# Patient Record
Sex: MALE | Race: OTHER | Employment: OTHER | URBAN - METROPOLITAN AREA
[De-identification: names, ages, dates, MRNs, and addresses within clinical notes are randomized per-mention and may not be internally consistent; named-entity substitution may affect disease eponyms.]

---

## 2019-08-12 ENCOUNTER — OFFICE VISIT (OUTPATIENT)
Dept: HEMATOLOGY | Age: 74
End: 2019-08-12

## 2019-08-12 ENCOUNTER — HOSPITAL ENCOUNTER (OUTPATIENT)
Dept: LAB | Age: 74
Discharge: HOME | End: 2019-08-12
Payer: MEDICARE

## 2019-08-12 VITALS
HEART RATE: 87 BPM | SYSTOLIC BLOOD PRESSURE: 144 MMHG | WEIGHT: 246.25 LBS | OXYGEN SATURATION: 96 % | TEMPERATURE: 98.6 F | DIASTOLIC BLOOD PRESSURE: 76 MMHG | HEIGHT: 70 IN | BODY MASS INDEX: 35.25 KG/M2

## 2019-08-12 DIAGNOSIS — Z11.59 ENCOUNTER FOR SCREENING FOR OTHER VIRAL DISEASES: ICD-10-CM

## 2019-08-12 DIAGNOSIS — K74.60 CIRRHOSIS OF LIVER WITHOUT ASCITES, UNSPECIFIED HEPATIC CIRRHOSIS TYPE (HCC): ICD-10-CM

## 2019-08-12 DIAGNOSIS — K74.60 CIRRHOSIS OF LIVER WITHOUT ASCITES, UNSPECIFIED HEPATIC CIRRHOSIS TYPE (HCC): Primary | ICD-10-CM

## 2019-08-12 LAB
ALBUMIN SERPL-MCNC: 3.8 G/DL (ref 3.4–5)
ALBUMIN/GLOB SERPL: 0.8 {RATIO} (ref 0.8–1.7)
ALP SERPL-CCNC: 77 U/L (ref 45–117)
ALT SERPL-CCNC: 40 U/L (ref 16–61)
ANION GAP SERPL CALC-SCNC: 9 MMOL/L (ref 3–18)
AST SERPL-CCNC: 49 U/L (ref 10–38)
BASOPHILS # BLD: 0 K/UL (ref 0–0.1)
BASOPHILS NFR BLD: 0 % (ref 0–2)
BILIRUB DIRECT SERPL-MCNC: 0.3 MG/DL (ref 0–0.2)
BILIRUB SERPL-MCNC: 0.7 MG/DL (ref 0.2–1)
BUN SERPL-MCNC: 13 MG/DL (ref 7–18)
BUN/CREAT SERPL: 12 (ref 12–20)
CALCIUM SERPL-MCNC: 9 MG/DL (ref 8.5–10.1)
CHLORIDE SERPL-SCNC: 104 MMOL/L (ref 100–111)
CO2 SERPL-SCNC: 27 MMOL/L (ref 21–32)
CREAT SERPL-MCNC: 1.11 MG/DL (ref 0.6–1.3)
DIFFERENTIAL METHOD BLD: ABNORMAL
EOSINOPHIL # BLD: 0.1 K/UL (ref 0–0.4)
EOSINOPHIL NFR BLD: 2 % (ref 0–5)
ERYTHROCYTE [DISTWIDTH] IN BLOOD BY AUTOMATED COUNT: 17.1 % (ref 11.6–14.5)
FERRITIN SERPL-MCNC: 39 NG/ML (ref 8–388)
GLOBULIN SER CALC-MCNC: 4.8 G/DL (ref 2–4)
GLUCOSE SERPL-MCNC: 163 MG/DL (ref 74–99)
HCT VFR BLD AUTO: 37.3 % (ref 36–48)
HGB BLD-MCNC: 11.3 G/DL (ref 13–16)
INR PPP: 1.1 (ref 0.8–1.2)
IRON SATN MFR SERPL: 11 %
IRON SERPL-MCNC: 43 UG/DL (ref 50–175)
LYMPHOCYTES # BLD: 1.9 K/UL (ref 0.9–3.6)
LYMPHOCYTES NFR BLD: 30 % (ref 21–52)
MCH RBC QN AUTO: 26 PG (ref 24–34)
MCHC RBC AUTO-ENTMCNC: 30.3 G/DL (ref 31–37)
MCV RBC AUTO: 85.7 FL (ref 74–97)
MONOCYTES # BLD: 0.5 K/UL (ref 0.05–1.2)
MONOCYTES NFR BLD: 9 % (ref 3–10)
NEUTS SEG # BLD: 3.7 K/UL (ref 1.8–8)
NEUTS SEG NFR BLD: 59 % (ref 40–73)
PLATELET # BLD AUTO: 137 K/UL (ref 135–420)
PMV BLD AUTO: 11 FL (ref 9.2–11.8)
POTASSIUM SERPL-SCNC: 4.2 MMOL/L (ref 3.5–5.5)
PROT SERPL-MCNC: 8.6 G/DL (ref 6.4–8.2)
PROTHROMBIN TIME: 14 SEC (ref 11.5–15.2)
RBC # BLD AUTO: 4.35 M/UL (ref 4.7–5.5)
SODIUM SERPL-SCNC: 140 MMOL/L (ref 136–145)
TIBC SERPL-MCNC: 395 UG/DL (ref 250–450)
WBC # BLD AUTO: 6.2 K/UL (ref 4.6–13.2)

## 2019-08-12 PROCEDURE — 87340 HEPATITIS B SURFACE AG IA: CPT

## 2019-08-12 PROCEDURE — 82103 ALPHA-1-ANTITRYPSIN TOTAL: CPT

## 2019-08-12 PROCEDURE — 80076 HEPATIC FUNCTION PANEL: CPT

## 2019-08-12 PROCEDURE — 80048 BASIC METABOLIC PNL TOTAL CA: CPT

## 2019-08-12 PROCEDURE — 86708 HEPATITIS A ANTIBODY: CPT

## 2019-08-12 PROCEDURE — 86803 HEPATITIS C AB TEST: CPT

## 2019-08-12 PROCEDURE — 86704 HEP B CORE ANTIBODY TOTAL: CPT

## 2019-08-12 PROCEDURE — 85610 PROTHROMBIN TIME: CPT

## 2019-08-12 PROCEDURE — 82107 ALPHA-FETOPROTEIN L3: CPT

## 2019-08-12 PROCEDURE — 36415 COLL VENOUS BLD VENIPUNCTURE: CPT

## 2019-08-12 PROCEDURE — 86706 HEP B SURFACE ANTIBODY: CPT

## 2019-08-12 PROCEDURE — 83540 ASSAY OF IRON: CPT

## 2019-08-12 PROCEDURE — 83516 IMMUNOASSAY NONANTIBODY: CPT

## 2019-08-12 PROCEDURE — 86038 ANTINUCLEAR ANTIBODIES: CPT

## 2019-08-12 PROCEDURE — 85025 COMPLETE CBC W/AUTO DIFF WBC: CPT

## 2019-08-12 PROCEDURE — 82728 ASSAY OF FERRITIN: CPT

## 2019-08-12 RX ORDER — TRAMADOL HYDROCHLORIDE 50 MG/1
50 TABLET ORAL
COMMUNITY
End: 2020-09-24

## 2019-08-12 RX ORDER — LISINOPRIL 5 MG/1
TABLET ORAL DAILY
COMMUNITY

## 2019-08-12 RX ORDER — ALBUTEROL SULFATE 0.83 MG/ML
SOLUTION RESPIRATORY (INHALATION)
COMMUNITY

## 2019-08-12 RX ORDER — CYCLOBENZAPRINE HCL 10 MG
TABLET ORAL
COMMUNITY
End: 2020-09-16

## 2019-08-12 NOTE — Clinical Note
9/3/19 Patient: Conner Arora. YOB: 1945 Date of Visit: 8/12/2019 Elizabeth Grove MD 
[facility] [address] [place] [address] VIA Facsimile: [phone] Dear Elizabeth Grove MD, Thank you for referring Mr. Jamilah Moise to [address] for evaluation. My notes for this consultation are attached. If you have questions, please do not hesitate to call me. I look forward to following your patient along with you. Sincerely, Lainey Amor MD

## 2019-08-12 NOTE — PROGRESS NOTES
33[facility], MD, [address], Cite Tushar Fernandez MD Norberto Martinet PAMADHAVI Russo, ACN-BC     Nay Acuna, [facility]   VAN WatkinsP-ALEJANDRO Gorman, [facility]       Adrian Ashraf Jose De Paris 136    at [address], [address] Maria Esther Carvajal  22.    [phone]    FAX: [address]    at [facility]    [address], [address]    [facility], [address]    [phone]    FAX: [phone]       Patient Care Team:  Danny Deluna MD as PCP - General (Family Practice)      Problem List  Date Reviewed: 6/1/2016          Codes Class Noted    Meniere disease (Chronic) ICD-10-CM: H81.09  ICD-9-CM: 386.00  5/26/2016        Heart murmur (Chronic) ICD-10-CM: R01.1  ICD-9-CM: 785.2  5/26/2016        Gout (Chronic) ICD-10-CM: M10.9  ICD-9-CM: 274.9  5/26/2016        History of colon cancer (Chronic) ICD-10-CM: Z85.038  ICD-9-CM: V10.05  5/26/2016        Kidney disease (Chronic) ICD-10-CM: N28.9  ICD-9-CM: 593.9  5/26/2016        Obesity (Chronic) ICD-10-CM: E66.9  ICD-9-CM: 278.00  5/26/2016        Osteoporosis (Chronic) ICD-10-CM: M81.0  ICD-9-CM: 733.00  5/26/2016        Complete rotator cuff tear or rupture of right shoulder, not specified as traumatic (Chronic) ICD-10-CM: M75.121  ICD-9-CM: 727.61  5/26/2016        Osteoarthritis, shoulder (Chronic) ICD-10-CM: M19.019  ICD-9-CM: 715.91  5/26/2016        Left knee DJD ICD-10-CM: M17.12  ICD-9-CM: 715.96  11/4/2013        Hypertension (Chronic) ICD-10-CM: I10  ICD-9-CM: 401.9  11/4/2013        Hyperlipidemia (Chronic) ICD-10-CM: E78.5  ICD-9-CM: 272.4  11/4/2013        Colon cancer ([facility] (Chronic) ICD-10-CM: C18.9  ICD-9-CM: 153.9  11/4/2013              The clinicians listed above have asked me to see Tucker Bishop Abby Medley. in consultation regarding management of cirrhosis secondary to    ANAYA. All medical records sent by the referring physicians were reviewed including imaging studies     The patient is a 72 y.o.  male who was found to have chronic liver disease and cirrhosis in 7/2019 when he had an MRI for evaluation of an abnormal liver CT scan. An assessment of liver fibrosis with biopsy or elastography has not been performed. Serologic evaluation for markers of chronic liver disease has either not been performed or the results are not available to me. The patient has not developed any of the major complications of cirrhosis to date. The patient has no symptoms which can be attributed to the liver disorder. The patient has not experienced the following symptoms:  fatigue, pain in the right side over the liver,     The patient completes all daily activities without any functional limitations. ASSESSMENT AND PLAN:  Cirrhosis  Cirrhosis is probably secondary to ANAYA. The diagnosis of cirrhosis is based upon imaging, laboratory studies    Serologic testing for causes of chronic liver disease were negative     The patient has normal liver function. The patient has never developed any complications of cirrhosis to date. The CTP is 5. Child class A. The MELD score is 8. NAFLD  Suspect the patient has fatty liver based upon imaging, features of metabolic syndrome, serologic studies that are negative for other causes of chronic liver disease,   The histologic severity has not been defined. Have performed laboratory testing to monitor liver function and degree of liver injury. This included BMP, hepatic panel, CBC with platelet count, INR. AST is elevated. ALT is normal.  ALP is normal.  Liver function is normal.  The platelet count is depressed. Only a liver biopsy can confirm if the patient does have fatty liver and differentiate NAFL from ANAYA.   The treatment is the same; weight reduction. If the liver biopsy demonstrates ANAYA the patient could be eligible for enrollment into clinical trials for treatment of ANAYA. The need to perform a liver biopsy to help determine the cause and severity of the liver test abnormalities was discussed. The risks of performing the liver biopsy including pain, puncture of the lung, gallbladder, intestine or kidney and bleeding were discussed. The patient has decided to have a liver biopsy. This will be scheduled. Counseling for diet and weight loss in patients with confirmed or suspected NAFLD  There is currently no FDA approved medical treatment for fatty liver, NALFD or ANAYA. The patient was counseled regarding diet and exercise to achieve weight loss. The best diet for patients with fatty liver is one very low in carbohydrates and enriched with protein such as an Alexis's program.      The patient was told not to consume any food products and drinks containing fructose as this enhances hepatic fat synthesis. There is no medication or vitamin supplements that we advocate for ANAYA. Using glitazones in patients without diabetes mellitus has been shown to reduce fat content in the liver but has no effect on fibrosis and is associated with weight gain. Vitamin E has also been used but the data is not very good and most experts no longer advocate this. The only medical treatments for ANAYA are though clinical trials. The patient would be a good candidate for enrollment into a clinical trial if found to have ANAYA. Screening for Esophageal varices   The patient has not had an EGD to screen for varices. Will schedule for EGD to assess for varices and need for banding. Hepatic encephalopathy   HE has not developed to date. There is no need for treatment with lactulose and/or Xifaxan at this time. There is no need to restrict dietary protein at this time.       Thrombocytopenia   This is secondary to cirrhosis. There is no evidence of overt bleeding. No treatment is required. The platelet count is adequate for the patient to undergo procedures without the need for platelet transfusion or platelet growth factors. Anemia   This is of unclear etiology. Likely due to multifactorial causes including portal hypertension with chronic GI blood loss,     Will obtain FE panel to assess for iron stores. 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 Will administer oral iron supplements. If the patient does not respond to oral FE will switch to intravenous iron. Will schedule for EGD to assess for UGI blood loss. Screening for Hepatocellular Carcinoma  HCC screening has recently been performed and does not suggest [facility] The next liver imaging study will be performed in 1/2020. Treatment of other medical problems in patients with chronic liver disease  There are no contraindications for the patient to take most medications that are necessary for treatment of other medical issues. The patient has cirrhrosis and should avoid taking NSAIDs which are associated with a higher rate of developing STEFAN. The patient can take Any medications utilized for treatment of DM, Statins to treat hypercholesterolemia    Counseling for alcohol in patients with chronic liver disease  The patient has cirrhosis and was advised to be abstinent from all alcohol including non-alcoholic beer which does contain some alcohol. The patient does not consume any significant amount of alcohol. Osteoporosis  The risk of osteoporosis is increased in patients with cirrhosis. DEXA bone density to assess for osteoporosis has not been performed. This should be ordered by the patients primary care physician.     Vaccinations   Vaccination for viral hepatitis A and B is recommended since the patient has no serologic evidence of previous exposure or vaccination with immunity. Routine vaccinations against other bacterial and viral agents can be performed as indicated. Annual flu vaccination should be administered if indicated. ALLERGIES  No Known Allergies    MEDICATIONS  Current Outpatient Medications   Medication Sig    albuterol (PROVENTIL VENTOLIN) 2.5 mg /3 mL (0.083 %) nebu by Nebulization route.  traMADol (ULTRAM) 50 mg tablet Take 50 mg by mouth every six (6) hours as needed for Pain.  lisinopril (PRINIVIL, ZESTRIL) 5 mg tablet Take  by mouth daily.  cyclobenzaprine (FLEXERIL) 10 mg tablet Take  by mouth three (3) times daily as needed for Muscle Spasm(s).  acetaminophen (TYLENOL) 500 mg tablet Take 500 mg by mouth every six (6) hours as needed for Pain.  cholecalciferol (VITAMIN D3) 1,000 unit cap Take 3,000 Units by mouth daily.  NIFEdipine ER (PROCARDIA XL) 30 mg ER tablet Take  by mouth daily.  METOPROLOL TARTRATE PO Take 50 mg by mouth two (2) times a day.  simvastatin (ZOCOR) 20 mg tablet Take 20 mg by mouth nightly.  cephALEXin (KEFLEX) 500 mg capsule Take 1,000 mg by mouth once. Indications: pre-op per MD instructions    oxyCODONE-acetaminophen (PERCOCET) 5-325 mg per tablet Take 1 Tab by mouth every four (4) hours as needed for Pain.  FOLIC ACID/MV,FE,MIN (CENTRUM PO) Take  by mouth daily. No current facility-administered medications for this visit. SYSTEM REVIEW NOT RELATED TO LIVER DISEASE OR REVIEWED ABOVE:  Constitution systems: Negative for fever, chills, weight gain, weight loss. Eyes: Negative for visual changes. ENT: Negative for sore throat, painful swallowing. Respiratory: Negative for cough, hemoptysis, SOB. Cardiology: Negative for chest pain, palpitations. GI:  Negative for constipation or diarrhea. : Negative for urinary frequency, dysuria, hematuria, nocturia. Skin: Negative for rash. Hematology: Negative for easy bruising, blood clots.     Musculo-skelatal: Negative for back pain, muscle pain, weakness. Neurologic: Negative for headaches, dizziness, vertigo, memory problems not related to HE. Psychology: Negative for anxiety, depression. FAMILY HISTORY:  The father  of heart disease. The mother  of heart disease. There is no family history of liver disease. SOCIAL HISTORY:  The patient is . The patient has 3 children, and 5 grandchildren and 2 ggc. The patient stopped using tobacco products in . The patient has never consumed significant amounts of alcohol. 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 PHYSICAL EXAMINATION:  Visit Vitals  /76   Pulse 87   Temp 98.6 °F (37 °C) (Tympanic)   Ht 5' 10\" (1.778 m)   Wt 246 lb 4 oz (111.7 kg)   SpO2 96%   BMI 35.33 kg/m²     General: No acute distress. Eyes: Sclera anicteric. ENT: No oral lesions. Thyroid normal.  Nodes: No adenopathy. Skin: No spider angiomata. No jaundice. No palmar erythema. Respiratory: Lungs clear to auscultation. Cardiovascular: Regular heart rate. No murmurs. No JVD. Abdomen: Soft non-tender. Liver size normal to percussion/palpation. Spleen not palpable. No obvious ascites. Extremities: No edema. No muscle wasting. No gross arthritic changes. Neurologic: Alert and oriented. Cranial nerves grossly intact. No asterixis.     LABORATORY STUDIES:  Liver [place] of [address] Units 2019   WBC 4.6 - 13.2 K/uL 6.2   ANC 1.8 - 8.0 K/UL 3.7   HGB 13.0 - 16.0 g/dL 11.3 (L)    - 420 K/uL 137   INR 0.8 - 1.2   1.1   AST 10 - 38 U/L 49 (H)   ALT 16 - 61 U/L 40   Alk Phos 45 - 117 U/L 77   Bili, Total 0.2 - 1.0 MG/DL 0.7   Bili, Direct 0.0 - 0.2 MG/DL 0.3 (H)   Albumin 3.4 - 5.0 g/dL 3.8   BUN 7.0 - 18 MG/DL 13   Creat 0.6 - 1.3 MG/DL 1.11   Na 136 - 145 mmol/L 140   K 3.5 - 5.5 mmol/L 4.2   Cl 100 - 111 mmol/L 104   CO2 21 - 32 mmol/L 27   Glucose 74 - 99 mg/dL 163 (H) SEROLOGIES:  Serologies Latest Ref Rng & Units 8/12/2019   Hep A Ab, Total NEGATIVE   NEGATIVE   Hep B Surface Ag <1.00 Index 0.46   Hep B Surface Ag Interp NEG   NEGATIVE   Hep B Core Ab, Total NEGATIVE   NEGATIVE   Hep B Surface Ab >10.0 mIU/mL <3.10 (L)   Hep B Surface Ab Interp POS   NEGATIVE (A)   Hep C Ab 0.0 - 0.9 s/co ratio 0.2   Ferritin 8 - 388 NG/ML 39   Iron % Saturation % 11   DALE, IFA  NEGATIVE   ASMCA 0 - 19 Units 19   Alpha-1 antitrypsin level 90 - 200 mg/dL 152     LIVER HISTOLOGY:  Not available or performed    ENDOSCOPIC PROCEDURES:  Not available or performed    RADIOLOGY:  2/2019. Ultrasound of liver. Echogenic consistent with chronic liver disease. No liver mass lesions. No dilated bile ducts. No ascites. 7/2019. Dynamic MRI liver. Changes consistent with cirrhosis and fatty liver. No liver mass lesions. No dilated bile ducts. No bile duct strictures. No ascites. OTHER TESTING:  Not available or performed    FOLLOW-UP:  All of the issues listed above in the Assessment and Plan were discussed with the patient. All questions were answered. The patient expressed a clear understanding of the above. [facility] 87 in 2 weeks after liver biopsy.       Eli Arcos MD  [address]  4 [facility], [address] [place] [address]  Brianne Guest, [address]  12 [facility]

## 2019-08-13 LAB
A1AT SERPL-MCNC: 152 MG/DL (ref 90–200)
ACTIN IGG SERPL-ACNC: 19 UNITS (ref 0–19)
AFP L3 MFR SERPL: 5.9 % (ref 0–9.9)
AFP SERPL-MCNC: 8.5 NG/ML (ref 0–8)
ANA TITR SER IF: NEGATIVE {TITER}
COMMENT, 144067: NORMAL
HAV AB SER QL IA: NEGATIVE
HBV CORE AB SERPL QL IA: NEGATIVE
HBV SURFACE AB SER QL IA: NEGATIVE
HBV SURFACE AB SERPL IA-ACNC: <3.1 MIU/ML
HBV SURFACE AG SER QL: 0.46 INDEX
HBV SURFACE AG SER QL: NEGATIVE
HCV AB S/CO SERPL IA: 0.2 S/CO RATIO (ref 0–0.9)
HEP BS AB COMMENT,HBSAC: ABNORMAL

## 2019-08-19 ENCOUNTER — HOSPITAL ENCOUNTER (OUTPATIENT)
Age: 74
Setting detail: OUTPATIENT SURGERY
Discharge: HOME | End: 2019-08-19
Attending: INTERNAL MEDICINE | Admitting: INTERNAL MEDICINE
Payer: MEDICARE

## 2019-08-19 ENCOUNTER — APPOINTMENT (OUTPATIENT)
Dept: ULTRASOUND IMAGING | Age: 74
End: 2019-08-19
Attending: INTERNAL MEDICINE
Payer: MEDICARE

## 2019-08-19 VITALS
OXYGEN SATURATION: 97 % | HEIGHT: 70 IN | BODY MASS INDEX: 31.07 KG/M2 | RESPIRATION RATE: 16 BRPM | DIASTOLIC BLOOD PRESSURE: 83 MMHG | WEIGHT: 217 LBS | SYSTOLIC BLOOD PRESSURE: 126 MMHG | TEMPERATURE: 98.2 F | HEART RATE: 74 BPM

## 2019-08-19 PROCEDURE — 77030018836 HC SOL IRR NACL ICUM -A: Performed by: INTERNAL MEDICINE

## 2019-08-19 PROCEDURE — 88313 SPECIAL STAINS GROUP 2: CPT

## 2019-08-19 PROCEDURE — 77030013826 HC NDL BIOP MAXCOR BARD -B: Performed by: INTERNAL MEDICINE

## 2019-08-19 PROCEDURE — 76040000019: Performed by: INTERNAL MEDICINE

## 2019-08-19 PROCEDURE — 88307 TISSUE EXAM BY PATHOLOGIST: CPT

## 2019-08-19 PROCEDURE — 76705 ECHO EXAM OF ABDOMEN: CPT

## 2019-08-19 PROCEDURE — 74011250636 HC RX REV CODE- 250/636: Performed by: INTERNAL MEDICINE

## 2019-08-19 RX ORDER — ONDANSETRON 2 MG/ML
4 INJECTION INTRAMUSCULAR; INTRAVENOUS
Status: DISCONTINUED | OUTPATIENT
Start: 2019-08-19 | End: 2019-08-19 | Stop reason: HOSPADM

## 2019-08-19 RX ORDER — LIDOCAINE HYDROCHLORIDE 10 MG/ML
10 INJECTION INFILTRATION; PERINEURAL ONCE
Status: COMPLETED | OUTPATIENT
Start: 2019-08-19 | End: 2019-08-19

## 2019-08-19 RX ORDER — SODIUM CHLORIDE 0.9 % (FLUSH) 0.9 %
5-40 SYRINGE (ML) INJECTION EVERY 8 HOURS
Status: CANCELLED | OUTPATIENT
Start: 2019-08-19

## 2019-08-19 RX ORDER — HYDROMORPHONE HYDROCHLORIDE 2 MG/ML
1 INJECTION, SOLUTION INTRAMUSCULAR; INTRAVENOUS; SUBCUTANEOUS
Status: DISCONTINUED | OUTPATIENT
Start: 2019-08-19 | End: 2019-08-19 | Stop reason: HOSPADM

## 2019-08-19 RX ORDER — SODIUM CHLORIDE 0.9 % (FLUSH) 0.9 %
5-40 SYRINGE (ML) INJECTION AS NEEDED
Status: CANCELLED | OUTPATIENT
Start: 2019-08-19

## 2019-08-19 NOTE — H&P
3340 [facility] MD Carbone [place], Cite Mongi Slim, Sherre Kennedy, MD Myrene Gutting, PAMADHAVI Dyson, ACN-BC     Nay Acuna, [facility]   Diandra Villela FNP-ALEJANDRO Meehan, [facility]       Adrian Ashraf Jose De Paris 136    at [address], [address] [place] [address], Maria Esther  22.    [phone]    FAX: [address] [place] [address] Avenue    [address], [address]    [phone]    FAX: [phone]         PRE-PROCEDURE NOTE - LIVER BIOPSY    H and P from last office visit reviewed. Allergies reviewed. Out-patient medication list reviewed. Patient Active Problem List   Diagnosis Code    Left knee DJD M17.12    Hypertension I10    Hyperlipidemia E78.5    Colon cancer ([address] C18.9    Meniere disease H81.09    Heart murmur R01.1    Gout M10.9    History of colon cancer Z85.038    Kidney disease N28.9    Obesity E66.9    Osteoporosis M81.0    Complete rotator cuff tear or rupture of right shoulder, not specified as traumatic M75.121    Osteoarthritis, shoulder M19.019       No Known Allergies    No current facility-administered medications on file prior to encounter. Current Outpatient Medications on File Prior to Encounter   Medication Sig Dispense Refill    albuterol (PROVENTIL VENTOLIN) 2.5 mg /3 mL (0.083 %) nebu by Nebulization route.  traMADol (ULTRAM) 50 mg tablet Take 50 mg by mouth every six (6) hours as needed for Pain.  lisinopril (PRINIVIL, ZESTRIL) 5 mg tablet Take  by mouth daily.  cyclobenzaprine (FLEXERIL) 10 mg tablet Take  by mouth three (3) times daily as needed for Muscle Spasm(s).  cholecalciferol (VITAMIN D3) 1,000 unit cap Take 3,000 Units by mouth daily.       FOLIC ACID/MV,FE,MIN (CENTRUM PO) Take  by mouth daily.  NIFEdipine ER (PROCARDIA XL) 30 mg ER tablet Take  by mouth daily.  METOPROLOL TARTRATE PO Take 50 mg by mouth two (2) times a day.  simvastatin (ZOCOR) 20 mg tablet Take 20 mg by mouth nightly. For liver biopsy to assess NALFD. The risks of the procedure were discussed with the patient. This included bleeding, pain, and puncture of other organs. All questions were answered. The patient wishes to proceed with the procedure. PHYSICAL EXAMINATION:  VS: per nursing note  General: No acute distress. Eyes: Sclera anicteric. ENT: No oral lesions. Thyroid normal.  Nodes: No adenopathy. Skin: No spider angiomata. No jaundice. No palmar erythema. Respiratory: Lungs clear to auscultation. Cardiovascular: Regular heart rate. No murmurs. No JVD. Abdomen: Soft non-tender, liver size normal to percussion/palpation. Spleen not palpable. No obvious ascites. Extremities: No edema. No muscle wasting. No gross arthritic changes. Neurologic: Alert and oriented. Cranial nerves grossly intact. No asterixis. LABS:  Lab Results   Component Value Date/Time    WBC 6.2 08/12/2019 03:17 PM    HGB 11.3 (L) 08/12/2019 03:17 PM    HCT 37.3 08/12/2019 03:17 PM    PLATELET 807 75/69/2893 03:17 PM    MCV 85.7 08/12/2019 03:17 PM     Lab Results   Component Value Date/Time    INR 1.1 08/12/2019 03:17 PM    Prothrombin time 14.0 08/12/2019 03:17 PM       ASSESSMENT AND PLAN:  Liver biopsy under ultrasound guidance.     Angela Cuba MD  [address]  4 [facility], [address] [place] [address] Fabrizio, [address]  12 [facility]

## 2019-08-19 NOTE — DISCHARGE INSTRUCTIONS
[address], MD, Marilee, Benito Osman MD Shedrick Cotton, PA-C Vesta Revel, [facility]-BC     Nay Acuna, [facility]-BC   Corinne Arena, FNP-C Yetta Hillier, [facility]       Adrian Deputado Jose De Paris 136    at [address], [address] [place] [address] 22.    [phone]    FAX: [address] [place] [address]    [address], [address], [address]    [phone]    FAX: [phone]         LIVER BIOPSY DISCHARGE [address] [facility] Raf.  1945  Date: 8/19/2019    DIET:    Rupesh Mendes may resume your previous diet. ACTIVITIES:  Rest quietly the rest of today. You should not lift any objects more than 20 pounds for the next 2 days. If you work sitting down without strenuous activity you may return to work tomorrow. If you exert yourself or do heavy lifting at work you should take tomorrow off. Do not drive or operate hazardous machinery for 12 hours after you are discharged from this procedure. SPECIAL INSTRUCTIONS:  Do not use any aspirin or non-steroidal (Motin, Advil, Naproxen, etc) pain medications for the next 2 days. You may use extra-strength Tylenol (acetaminophen) if you experience pain or discomfort later today. Restarting blood thinners: If you were taking blood thinners prior to the procedure you can restart these in 2 days. Call the [facility] [address] office if you experience any of the following:  Persistent or severe abdominal pain. Persistent or severe abdominal distention. Fever and chills   Nausea and vomiting. New or unusual symptoms. Follow-up care: You should have a follow up appointment with Dr. Renetta Rivera to review the results of the liver biopsy results in 2 weeks.   If you do not have an appointment please call the office at the number listed above to schedule this. Other instructions: If you have any problems or questions call the [address] office at the phone number listed above. DISCHARGE SUMMARY from Nurse: The following personal items collected during your admission are returned to you:   Dental Appliance: Dental Appliances: None  Vision: Visual Aid: Glasses(on person)  Hearing Aid:    Jewelry:    Clothing:    Other Valuables:    Valuables sent to safe:         DISCHARGE SUMMARY from Nurse    PATIENT INSTRUCTIONS:    After general anesthesia or intravenous sedation, for 24 hours or while taking prescription Narcotics:  · Limit your activities  · Do not drive and operate hazardous machinery  · Do not make important personal or business decisions  · Do  not drink alcoholic beverages  · If you have not urinated within 8 hours after discharge, please contact your surgeon on call. Report the following to your surgeon:  · Excessive pain, swelling, redness or odor of or around the surgical area  · Temperature over 100.5  · Nausea and vomiting lasting longer than 4 hours or if unable to take medications  · Any signs of decreased circulation or nerve impairment to extremity: change in color, persistent  numbness, tingling, coldness or increase pain  · Any questions    What to do at Home:  Recommended activity: as above,     If you experience any of the following symptoms as above, please follow up with Dr. Vidal Dawn. *  Please give a list of your current medications to your Primary Care Provider. *  Please update this list whenever your medications are discontinued, doses are      changed, or new medications (including over-the-counter products) are added. *  Please carry medication information at all times in case of emergency situations.     These are general instructions for a healthy lifestyle:    No smoking/ No tobacco products/ Avoid exposure to second hand smoke  Surgeon General's Warning:  Quitting smoking now greatly reduces serious risk to your health. Obesity, smoking, and sedentary lifestyle greatly increases your risk for illness    A healthy diet, regular physical exercise & weight monitoring are important for maintaining a healthy lifestyle    You may be retaining fluid if you have a history of heart failure or if you experience any of the following symptoms:  Weight gain of 3 pounds or more overnight or 5 pounds in a week, increased swelling in our hands or feet or shortness of breath while lying flat in bed. Please call your doctor as soon as you notice any of these symptoms; do not wait until your next office visit. The discharge information has been reviewed with the patient. The patient verbalized understanding. Discharge medications reviewed with the patient and appropriate educational materials and side effects teaching were provided.   ___________________________________________________________________________________________________________________________________  Patient armband removed and shredded

## 2019-08-19 NOTE — PROCEDURES
[facility], Cite Mongi Slim, Luvenia Nodal, MD Mila Broker, PETEY Lema, [facility]-BC     Nay Acuna, [facility]   MARGARITO Connors-ALEJANDRO Alvarado, [facility]       Adrian [facility] 136    at [address], [address] [facility]    [address] [facility] 22.    [phone]    FAX: [address] [place] [address], [address]    [phone]    FAX: [phone]         LIVER BIOPSY PROCEDURE NOTE    Sara Francis  1945    INDICATIONS/PRE-OPERATIVE  DIAGNOSIS:  NAFLD    : Diomedes Avina MD    SEDATION: 1% Lidocaine injection 10 ml    PROCEDURE:  Informed consent to perform the procedure was obtained from the patient. The patient was positioned on the edge of the stretcher lying flat in the supine position. Ultrasound was utilized to image the liver. The diaphragm and any major mass lesion or vascular structures within the liver were identified. An appropriate site for liver biopsy was identified. The distance from the surface of the skin to the liver capsule was 4 cm. This area was prepped with betadine and draped in sterile fashion. The skin was infiltrated with 1% lidocaine. The deeper subcutanous tissues and liver capsule overlying the biopsy site were then infiltrated with 1% lidocaine until appropriate anesthesia was obtained. A small incision was made in the skin so the biopsy devise could be easily inserted. A total of 2 passes with the 16 gauge Bard biopsy devise was then made into the liver. Core(s) of liver tissue totaling 4 cm in length were obtained and placed into tissue fixative. A band aid was placed over the biopsy site.   The patient was then repositioned on the right side and transported to the recovery area on the stretcher for routine monitoring until discharge. The specimen was sent to pathology for processing via the normal transport mechanism. SPECIMEN REMOVED: Liver    ESTIMATED BLOOD LOSS: Negligible.       POST-OPERATIVE DIAGNOSIS: Same as Pre-operative Diagnosis    Vitaly Arboleda MD  [address]  [address] [place] [address] 58, [address]  [address] [place] [address]

## 2019-08-19 NOTE — PERIOP NOTES
Education completed, wife at bedside. Puncture site covered with band-aide, clean, dry and intact. No complaints of pain.

## 2019-09-10 ENCOUNTER — OFFICE VISIT (OUTPATIENT)
Dept: HEMATOLOGY | Age: 74
End: 2019-09-10

## 2019-09-10 VITALS
SYSTOLIC BLOOD PRESSURE: 141 MMHG | OXYGEN SATURATION: 98 % | HEART RATE: 90 BPM | DIASTOLIC BLOOD PRESSURE: 72 MMHG | BODY MASS INDEX: 35.84 KG/M2 | TEMPERATURE: 99.5 F | WEIGHT: 250.38 LBS | HEIGHT: 70 IN

## 2019-09-10 DIAGNOSIS — K74.60 CIRRHOSIS OF LIVER WITHOUT ASCITES, UNSPECIFIED HEPATIC CIRRHOSIS TYPE (HCC): Primary | ICD-10-CM

## 2019-09-10 NOTE — Clinical Note
9/16/19 Patient: Jorge Márquez. YOB: 1945 Date of Visit: 9/10/2019 Virginie Potter MD 
[facility] [address] [place] [address] VIA Facsimile: [phone] Dear Virginie Potter MD, Thank you for referring Mr. Mark Yepez to [address] [place] [address],[address] for evaluation. My notes for this consultation are attached. If you have questions, please do not hesitate to call me. I look forward to following your patient along with you. Sincerely, Raffy Lopez MD

## 2019-09-10 NOTE — PROGRESS NOTES
[address] [facility], MD, [address], Cite Elena Chapin, Dale Overton MD      [place] Room, PA-ALEJANDRO Florian, ACNP-BC     Nay Acuna, AGPCNP-BC   Marcellussiobhan Mullenisaac, FNPMADHAVI Leary, [facility] ServicesNP-BC       [facility] Hospital[facility] [address]    at [facility], [address] [address] Carv[address]al  22.    [phone]    FAX: [address]    at [facility]    [address], [address]    98 [facility], [address]    [phone]    FAX: [phone]       Patient Care Team:  Evonne De Jesus MD as PCP - General (Family Practice)      Problem List  Date Reviewed: 9/3/2019          Codes Class Noted    Meniere disease (Chronic) ICD-10-CM: H81.09  ICD-9-CM: 386.00  5/26/2016        Heart murmur (Chronic) ICD-10-CM: R01.1  ICD-9-CM: 785.2  5/26/2016        Gout (Chronic) ICD-10-CM: M10.9  ICD-9-CM: 274.9  5/26/2016        History of colon cancer (Chronic) ICD-10-CM: Z85.038  ICD-9-CM: V10.05  5/26/2016        Kidney disease (Chronic) ICD-10-CM: N28.9  ICD-9-CM: 593.9  5/26/2016        Obesity (Chronic) ICD-10-CM: E66.9  ICD-9-CM: 278.00  5/26/2016        Osteoporosis (Chronic) ICD-10-CM: M81.0  ICD-9-CM: 733.00  5/26/2016        Complete rotator cuff tear or rupture of right shoulder, not specified as traumatic (Chronic) ICD-10-CM: M75.121  ICD-9-CM: 727.61  5/26/2016        Osteoarthritis, shoulder (Chronic) ICD-10-CM: M19.019  ICD-9-CM: 715.91  5/26/2016        Left knee DJD ICD-10-CM: M17.12  ICD-9-CM: 715.96  11/4/2013        Hypertension (Chronic) ICD-10-CM: I10  ICD-9-CM: 401.9  11/4/2013        Hyperlipidemia (Chronic) ICD-10-CM: E78.5  ICD-9-CM: 272.4  11/4/2013        [facility] (Chronic) ICD-10-CM: C18.9  ICD-9-CM: 153.9  11/4/2013              The clinicians listed above have asked me to see Esther Klein Carol Santiago. in consultation regarding management Houston Manolo. 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 The active problem list, all pertinent past medical history, medications,   liver histology, radiologic findings and laboratory findings related to the liver disorder were reviewed with the patient. The patient is a 72 y.o.  male who was found to have chronic liver disease and cirrhosis in 7/2019 when he had an MRI for evaluation of an abnormal liver CT scan. The patient underwent a liver biopsy in 8/2019. The procedure was well tolerated. I have personally reviewed the liver biopsy slides. This demonstrates ANAYA with cirrhosis. The patient has not developed any of the major complications of cirrhosis to date. The patient has no symptoms which can be attributed to the liver disorder. The patient has not experienced the following symptoms:  fatigue, pain in the right side over the liver,     The patient completes all daily activities without any functional limitations. ASSESSMENT AND PLAN:  Cirrhosis  Cirrhosis is secondary to ANAYA. The diagnosis of cirrhosis is based upon liver biopsy, imaging, laboratory studies    The patient has normal liver function. The patient has never developed any complications of cirrhosis to date. The CTP is 5. Child class A. The MELD score is 8. ANAYA  The diagnosis is based upon liver biopsy, features of metabolic syndrome, serologic studies that are negative for other causes of chronic liver disease,   A liver biopsy performed in 8/2019 shows ANAYA with cirrhosis. AST is elevated. ALT is normal.  ALP is normal.  Liver function is normal.  The platelet count is depressed.      Serologic testing for causes of chronic liver disease were negative     Counseling for diet and weight loss in patients with confirmed or suspected NAFLD  There is currently no FDA approved medical treatment for fatty liver, NALFD or ANAYA. The patient was counseled regarding diet and exercise to achieve weight loss. The best diet for patients with fatty liver is one very low in carbohydrates and enriched with protein such as an Alexis's program.      The patient was told not to consume any food products and drinks containing fructose as this enhances hepatic fat synthesis. There is no medication or vitamin supplements that we advocate for ANAYA. Using glitazones in patients without diabetes mellitus has been shown to reduce fat content in the liver but has no effect on fibrosis and is associated with weight gain. Vitamin E has also been used but the data is not very good and most experts no longer advocate this. The only medical treatments for ANAYA are though clinical trials. The patient was offered participation in a clinical trial for treatment of ANAYA. Screening for Esophageal varices   The patient has not had an EGD to screen for varices. Will schedule for EGD to assess for varices and need for banding. Hepatic encephalopathy   HE has not developed to date. There is no need for treatment with lactulose and/or Xifaxan at this time. There is no need to restrict dietary protein at this time. Thrombocytopenia   This is secondary to cirrhosis. There is no evidence of overt bleeding. No treatment is required. The platelet count is adequate for the patient to undergo procedures without the need for platelet transfusion or platelet growth factors. 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 Will administer oral iron supplements. If the patient does not respond to oral FE will switch to intravenous iron. Will schedule for EGD to assess for UGI blood loss.       Screening for Hepatocellular Carcinoma  HCC screening has recently been performed and does not suggest [facility] [address] The next liver imaging study will be performed in 1/2020. Treatment of other medical problems in patients with chronic liver disease  There are no contraindications for the patient to take most medications that are necessary for treatment of other medical issues. The patient has cirrhrosis and should avoid taking NSAIDs which are associated with a higher rate of developing STEFAN. The patient can take Any medications utilized for treatment of DM, Statins to treat hypercholesterolemia    Counseling for alcohol in patients with chronic liver disease  The patient has cirrhosis and was advised to be abstinent from all alcohol including non-alcoholic beer which does contain some alcohol. The patient does not consume any significant amount of alcohol. Osteoporosis  The risk of osteoporosis is increased in patients with cirrhosis. DEXA bone density to assess for osteoporosis has not been performed. This should be ordered by the patients primary care physician. Vaccinations   Vaccination for viral hepatitis A and B is recommended since the patient has no serologic evidence of previous exposure or vaccination with immunity. Routine vaccinations against other bacterial and viral agents can be performed as indicated. Annual flu vaccination should be administered if indicated. ALLERGIES  No Known Allergies    MEDICATIONS  Current Outpatient Medications   Medication Sig    albuterol (PROVENTIL VENTOLIN) 2.5 mg /3 mL (0.083 %) nebu by Nebulization route.  traMADol (ULTRAM) 50 mg tablet Take 50 mg by mouth every six (6) hours as needed for Pain.  lisinopril (PRINIVIL, ZESTRIL) 5 mg tablet Take  by mouth daily.  cyclobenzaprine (FLEXERIL) 10 mg tablet Take  by mouth three (3) times daily as needed for Muscle Spasm(s).  cholecalciferol (VITAMIN D3) 1,000 unit cap Take 3,000 Units by mouth daily.  FOLIC ACID/MV,FE,MIN (CENTRUM PO) Take  by mouth daily.  NIFEdipine ER (PROCARDIA XL) 30 mg ER tablet Take  by mouth daily.  METOPROLOL TARTRATE PO Take 50 mg by mouth two (2) times a day.  simvastatin (ZOCOR) 20 mg tablet Take 20 mg by mouth nightly. No current facility-administered medications for this visit. SYSTEM REVIEW NOT RELATED TO LIVER DISEASE OR REVIEWED ABOVE:  Constitution systems: Negative for fever, chills, weight gain, weight loss. Eyes: Negative for visual changes. ENT: Negative for sore throat, painful swallowing. Respiratory: Negative for cough, hemoptysis, SOB. Cardiology: Negative for chest pain, palpitations. GI:  Negative for constipation or diarrhea. : Negative for urinary frequency, dysuria, hematuria, nocturia. Skin: Negative for rash. Hematology: Negative for easy bruising, blood clots. Musculo-skelatal: Negative for back pain, muscle pain, weakness. Neurologic: Negative for headaches, dizziness, vertigo, memory problems not related to HE. Psychology: Negative for anxiety, depression. FAMILY HISTORY:  The father  of heart disease. The mother  of heart disease. There is no family history of liver disease. SOCIAL HISTORY:  The patient is . The patient has 3 children, and 5 grandchildren and 2 ggc. The patient stopped using tobacco products in . The patient has never consumed significant amounts of alcohol. 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 PHYSICAL EXAMINATION:  Visit Vitals  /72   Pulse 90   Temp 99.5 °F (37.5 °C) (Tympanic)   Ht 5' 10\" (1.778 m)   Wt 250 lb 6 oz (113.6 kg)   SpO2 98%   BMI 35.93 kg/m²     General: No acute distress. Eyes: Sclera anicteric. ENT: No oral lesions. Thyroid normal.  Nodes: No adenopathy. Skin: No spider angiomata. No jaundice. No palmar erythema. Respiratory: Lungs clear to auscultation. Cardiovascular: Regular heart rate. No murmurs. No JVD. Abdomen: Soft non-tender. Liver size normal to percussion/palpation. Spleen not palpable. No obvious ascites. Extremities: No edema. No muscle wasting. No gross arthritic changes. Neurologic: Alert and oriented. Cranial nerves grossly intact. No asterixis. LABORATORY STUDIES:  Liver [place] [address] Units 8/12/2019   WBC 4.6 - 13.2 K/uL 6.2   ANC 1.8 - 8.0 K/UL 3.7   HGB 13.0 - 16.0 g/dL 11.3 (L)    - 420 K/uL 137   INR 0.8 - 1.2   1.1   AST 10 - 38 U/L 49 (H)   ALT 16 - 61 U/L 40   Alk Phos 45 - 117 U/L 77   Bili, Total 0.2 - 1.0 MG/DL 0.7   Bili, Direct 0.0 - 0.2 MG/DL 0.3 (H)   Albumin 3.4 - 5.0 g/dL 3.8   BUN 7.0 - 18 MG/DL 13   Creat 0.6 - 1.3 MG/DL 1.11   Na 136 - 145 mmol/L 140   K 3.5 - 5.5 mmol/L 4.2   Cl 100 - 111 mmol/L 104   CO2 21 - 32 mmol/L 27   Glucose 74 - 99 mg/dL 163 (H)     SEROLOGIES:  Serologies Latest Ref Rng & Units 8/12/2019   Hep A Ab, Total NEGATIVE   NEGATIVE   Hep B Surface Ag <1.00 Index 0.46   Hep B Surface Ag Interp NEG   NEGATIVE   Hep B Core Ab, Total NEGATIVE   NEGATIVE   Hep B Surface Ab >10.0 mIU/mL <3.10 (L)   Hep B Surface Ab Interp POS   NEGATIVE (A)   Hep C Ab 0.0 - 0.9 s/co ratio 0.2   Ferritin 8 - 388 NG/ML 39   Iron % Saturation % 11   DALE, IFA  NEGATIVE   ASMCA 0 - 19 Units 19   Alpha-1 antitrypsin level 90 - 200 mg/dL 152     LIVER HISTOLOGY:  8/2019. Slides reviewed by MLS. ANAYA.  10-25% macrovesicualr and micovesicular steatosis, mild inflammation, severe ballooning, Stage 4 fibrosis. ENDOSCOPIC PROCEDURES:  Not available or performed    RADIOLOGY:  2/2019. Ultrasound of liver. Echogenic consistent with chronic liver disease. No liver mass lesions. No dilated bile ducts. No ascites. 7/2019. Dynamic MRI liver. Changes consistent with cirrhosis and fatty liver. No liver mass lesions. No dilated bile ducts. No bile duct strictures. No ascites.     OTHER TESTING:  Not available or performed    FOLLOW-UP:  All of the issues listed above in the Assessment and Plan were discussed with the patient. All questions were answered. The patient expressed a clear understanding of the above. [facility] [place] [address] in 3 months for routine monitoring.       Shree Batista MD  [id number] [address], [address] [place] [address], 300 [facility] [address]  12 [facility]

## 2019-10-02 ENCOUNTER — DOCUMENTATION ONLY (OUTPATIENT)
Dept: HEMATOLOGY | Age: 74
End: 2019-10-02

## 2019-10-02 NOTE — PROGRESS NOTES
Pt name given to research department for consideration for enrollment into clinical trials for ANAYA cx Intercept [id number] study. Pt contacted and states that not interested in clinical trials at this time. Pt verbalized does not want anymore procedures done. Trial includes 2 liver biopsies and 2 EGDs and states working on weight loss and has lost 10# since last office visit.  sent ICF and business card to patient's home address for review in case patient changes mind. Confirmed follow up appointment in clinic for 10 Dec 2019.

## 2019-12-10 ENCOUNTER — OFFICE VISIT (OUTPATIENT)
Dept: HEMATOLOGY | Age: 74
End: 2019-12-10

## 2019-12-10 ENCOUNTER — HOSPITAL ENCOUNTER (OUTPATIENT)
Dept: LAB | Age: 74
Discharge: HOME | End: 2019-12-10
Payer: MEDICARE

## 2019-12-10 VITALS
HEART RATE: 70 BPM | OXYGEN SATURATION: 98 % | HEIGHT: 70 IN | DIASTOLIC BLOOD PRESSURE: 59 MMHG | WEIGHT: 233 LBS | BODY MASS INDEX: 33.36 KG/M2 | TEMPERATURE: 98 F | SYSTOLIC BLOOD PRESSURE: 126 MMHG

## 2019-12-10 DIAGNOSIS — K74.60 CIRRHOSIS OF LIVER WITHOUT ASCITES, UNSPECIFIED HEPATIC CIRRHOSIS TYPE (HCC): Primary | ICD-10-CM

## 2019-12-10 DIAGNOSIS — K74.60 CIRRHOSIS OF LIVER WITHOUT ASCITES, UNSPECIFIED HEPATIC CIRRHOSIS TYPE (HCC): ICD-10-CM

## 2019-12-10 LAB
ALBUMIN SERPL-MCNC: 4.1 G/DL (ref 3.4–5)
ALBUMIN/GLOB SERPL: 1 {RATIO} (ref 0.8–1.7)
ALP SERPL-CCNC: 65 U/L (ref 45–117)
ALT SERPL-CCNC: 37 U/L (ref 16–61)
ANION GAP SERPL CALC-SCNC: 4 MMOL/L (ref 3–18)
AST SERPL-CCNC: 31 U/L (ref 10–38)
BASOPHILS # BLD: 0 K/UL (ref 0–0.1)
BASOPHILS NFR BLD: 0 % (ref 0–2)
BILIRUB SERPL-MCNC: 0.8 MG/DL (ref 0.2–1)
BUN SERPL-MCNC: 19 MG/DL (ref 7–18)
BUN/CREAT SERPL: 17 (ref 12–20)
CALCIUM SERPL-MCNC: 9.3 MG/DL (ref 8.5–10.1)
CHLORIDE SERPL-SCNC: 105 MMOL/L (ref 100–111)
CO2 SERPL-SCNC: 29 MMOL/L (ref 21–32)
CREAT SERPL-MCNC: 1.11 MG/DL (ref 0.6–1.3)
DIFFERENTIAL METHOD BLD: ABNORMAL
EOSINOPHIL # BLD: 0.1 K/UL (ref 0–0.4)
EOSINOPHIL NFR BLD: 1 % (ref 0–5)
ERYTHROCYTE [DISTWIDTH] IN BLOOD BY AUTOMATED COUNT: 15.2 % (ref 11.6–14.5)
GLOBULIN SER CALC-MCNC: 4.2 G/DL (ref 2–4)
GLUCOSE SERPL-MCNC: 87 MG/DL (ref 74–99)
HCT VFR BLD AUTO: 42.4 % (ref 36–48)
HGB BLD-MCNC: 13.2 G/DL (ref 13–16)
LYMPHOCYTES # BLD: 1.9 K/UL (ref 0.9–3.6)
LYMPHOCYTES NFR BLD: 25 % (ref 21–52)
MCH RBC QN AUTO: 28.4 PG (ref 24–34)
MCHC RBC AUTO-ENTMCNC: 31.1 G/DL (ref 31–37)
MCV RBC AUTO: 91.4 FL (ref 74–97)
MONOCYTES # BLD: 0.6 K/UL (ref 0.05–1.2)
MONOCYTES NFR BLD: 8 % (ref 3–10)
NEUTS SEG # BLD: 5 K/UL (ref 1.8–8)
NEUTS SEG NFR BLD: 66 % (ref 40–73)
PLATELET # BLD AUTO: 137 K/UL (ref 135–420)
PMV BLD AUTO: 11 FL (ref 9.2–11.8)
POTASSIUM SERPL-SCNC: 4.8 MMOL/L (ref 3.5–5.5)
PROT SERPL-MCNC: 8.3 G/DL (ref 6.4–8.2)
RBC # BLD AUTO: 4.64 M/UL (ref 4.7–5.5)
SODIUM SERPL-SCNC: 138 MMOL/L (ref 136–145)
WBC # BLD AUTO: 7.6 K/UL (ref 4.6–13.2)

## 2019-12-10 PROCEDURE — 85025 COMPLETE CBC W/AUTO DIFF WBC: CPT

## 2019-12-10 PROCEDURE — 36415 COLL VENOUS BLD VENIPUNCTURE: CPT

## 2019-12-10 PROCEDURE — 80053 COMPREHEN METABOLIC PANEL: CPT

## 2019-12-10 NOTE — PROGRESS NOTES
Tha Pedersen. is a 69 y.o. male      1. Have you been to the ER, urgent care clinic or hospitalized since your last visit? NO.     2. Have you seen or consulted any other health care providers outside of the [address] [place] [address] since your last visit (Include any pap smears or colon screening)? YES    Patient seen PCP since last visit for a sinus infection. No flowsheet data found. y

## 2019-12-10 NOTE — PROGRESS NOTES
[facility] [facility], MD, MD Jovana Macias PA-C Danae [place], [facility]     April MEHNAZ Acuna, [facility]   MARGARITO Lee-ALEJANDRO Ramos, [facility]       Adrian Ashraf [facility] 136    at [address], 81 [facility], [facility] 22.    [phone]    FAX: [address] [place] [address] Avenue    [facility]    [address], [address]    [place], [address] [place] [address] - [address]    [phone]    FAX: [phone]       Patient Care Team:  Roldan Mendes MD as PCP - General (Family Practice)      Problem List  Date Reviewed: 9/16/2019          Codes Class Noted    Other cirrhosis of liver [facility]) ICD-10-CM: K74.69  ICD-9-CM: 571.5  12/16/2019        ANAYA (nonalcoholic steatohepatitis) ICD-10-CM: K75.81  ICD-9-CM: 571.8  12/16/2019        Meniere disease (Chronic) ICD-10-CM: H81.09  ICD-9-CM: 386.00  5/26/2016        Heart murmur (Chronic) ICD-10-CM: R01.1  ICD-9-CM: 785.2  5/26/2016        Gout (Chronic) ICD-10-CM: M10.9  ICD-9-CM: 274.9  5/26/2016        History of colon cancer (Chronic) ICD-10-CM: E87.556  ICD-9-CM: V10.05  5/26/2016        Kidney disease (Chronic) ICD-10-CM: N28.9  ICD-9-CM: 593.9  5/26/2016        Obesity (Chronic) ICD-10-CM: E66.9  ICD-9-CM: 278.00  5/26/2016        Osteoporosis (Chronic) ICD-10-CM: M81.0  ICD-9-CM: 733.00  5/26/2016        Complete rotator cuff tear or rupture of right shoulder, not specified as traumatic (Chronic) ICD-10-CM: M75.121  ICD-9-CM: 727.61  5/26/2016        Osteoarthritis, shoulder (Chronic) ICD-10-CM: M19.019  ICD-9-CM: 715.91  5/26/2016        Left knee DJD ICD-10-CM: M17.12  ICD-9-CM: 715.96  11/4/2013        Hypertension (Chronic) ICD-10-CM: I10  ICD-9-CM: 401.9  11/4/2013        Hyperlipidemia (Chronic) ICD-10-CM: N09.1  ICD-9-CM: 272.4  11/4/2013        Colon cancer ([facility] [address] (Chronic) ICD-10-CM: C18.9  ICD-9-CM: 153.9  11/4/2013              Laura Francis returns to the [place] [address] for management of 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 The active problem list, all pertinent past medical history, medications,   liver histology, radiologic findings and laboratory findings related to the liver disorder were reviewed with the patient. The patient is a 69 y.o.  male who was found to have chronic liver disease and cirrhosis in 7/2019 when he had an MRI for evaluation of an abnormal liver CT scan. The patient underwent a liver biopsy in 8/2019. This demonstrates ANAYA with cirrhosis. The patient has not developed any of the major complications of cirrhosis to date. The patient has no symptoms which can be attributed to the liver disorder. The patient has not experienced the following symptoms:  fatigue, pain in the right side over the liver,     The patient completes all daily activities without any functional limitations. ASSESSMENT AND PLAN:  Cirrhosis  Cirrhosis is secondary to ANAYA. The diagnosis of cirrhosis is based upon liver biopsy, imaging, laboratory studies    The patient has normal liver function. The patient has never developed any complications of cirrhosis to date. The CTP is 5. Child class A. The MELD score is 8. ANAYA  The diagnosis is based upon liver biopsy, features of metabolic syndrome, serologic studies that are negative for other causes of chronic liver disease,   A liver biopsy performed in 8/2019 shows ANAYA with cirrhosis. Liver transaminases are normal.  ALP is normal.  Liver function is normal.  The platelet count is depressed.      Serologic testing for causes of chronic liver disease were negative     Counseling for diet and weight loss in patients with confirmed or suspected NAFLD  There is currently no FDA approved medical treatment for fatty liver, NALFD or ANAYA. The patient was counseled regarding diet and exercise to achieve weight loss. The best diet for patients with fatty liver is one very low in carbohydrates and enriched with protein such as an Alexis's program.      The patient was told not to consume any food products and drinks containing fructose as this enhances hepatic fat synthesis. There is no medication or vitamin supplements that we advocate for ANAYA. Using glitazones in patients without diabetes mellitus has been shown to reduce fat content in the liver but has no effect on fibrosis and is associated with weight gain. Vitamin E has also been used but the data is not very good and most experts no longer advocate this. The only medical treatments for ANAYA are through clinical trials. The patient was offered participation in a clinical trial for treatment of ANAYA. Screening for Esophageal varices   The patient has not had an EGD to screen for varices. Will schedule for EGD to assess for varices and need for banding. Hepatic encephalopathy   HE has not developed to date. There is no need for treatment with lactulose and/or Xifaxan at this time. There is no need to restrict dietary protein at this time. Thrombocytopenia   This is secondary to cirrhosis. There is no evidence of overt bleeding. No treatment is required. The platelet count is adequate for the patient to undergo procedures without the need for platelet transfusion or platelet growth factors. Anemia   This has resolved. Will continue oral iron supplements. Screening for Hepatocellular Carcinoma  HCC screening has recently been performed and does not suggest [facility] [address] The next liver imaging study will be performed in 1/2020.     Treatment of other medical problems in patients with chronic liver disease  There are no contraindications for the patient to take most medications that are necessary for treatment of other medical issues. The patient has cirrhrosis and should avoid taking NSAIDs which are associated with a higher rate of developing STEFAN. The patient can take any medications utilized for treatment of DM, Statins to treat hypercholesterolemia    Counseling for alcohol in patients with chronic liver disease  The patient has cirrhosis and was advised to be abstinent from all alcohol including non-alcoholic beer which does contain some alcohol. The patient does not consume any significant amount of alcohol. Osteoporosis  The risk of osteoporosis is increased in patients with cirrhosis. DEXA bone density to assess for osteoporosis has not been performed. This should be ordered by the patients primary care physician. Vaccinations   Vaccination for viral hepatitis A and B is recommended since the patient has no serologic evidence of previous exposure or vaccination with immunity. Routine vaccinations against other bacterial and viral agents can be performed as indicated. Annual flu vaccination should be administered if indicated. ALLERGIES  No Known Allergies    MEDICATIONS  Current Outpatient Medications   Medication Sig    albuterol (PROVENTIL VENTOLIN) 2.5 mg /3 mL (0.083 %) nebu by Nebulization route.  traMADol (ULTRAM) 50 mg tablet Take 50 mg by mouth every six (6) hours as needed for Pain.  lisinopril (PRINIVIL, ZESTRIL) 5 mg tablet Take  by mouth daily.  cyclobenzaprine (FLEXERIL) 10 mg tablet Take  by mouth three (3) times daily as needed for Muscle Spasm(s).  cholecalciferol (VITAMIN D3) 1,000 unit cap Take 3,000 Units by mouth daily.  FOLIC ACID/MV,FE,MIN (CENTRUM PO) Take  by mouth daily.  NIFEdipine ER (PROCARDIA XL) 30 mg ER tablet Take  by mouth daily.  METOPROLOL TARTRATE PO Take 50 mg by mouth two (2) times a day.  simvastatin (ZOCOR) 20 mg tablet Take 20 mg by mouth nightly. No current facility-administered medications for this visit.         SYSTEM REVIEW NOT RELATED TO LIVER DISEASE OR REVIEWED ABOVE:  Constitution systems: Negative for fever, chills, weight gain, weight loss. Eyes: Negative for visual changes. ENT: Negative for sore throat, painful swallowing. Respiratory: Negative for cough, hemoptysis, SOB. Cardiology: Negative for chest pain, palpitations. GI:  Negative for constipation or diarrhea. : Negative for urinary frequency, dysuria, hematuria, nocturia. Skin: Negative for rash. Hematology: Negative for easy bruising, blood clots. Musculo-skelatal: Negative for back pain, muscle pain, weakness. Neurologic: Negative for headaches, dizziness, vertigo, memory problems not related to HE. Psychology: Negative for anxiety, depression. FAMILY HISTORY:  The father  of heart disease. The mother  of heart disease. There is no family history of liver disease. SOCIAL HISTORY:  The patient is . The patient has 3 children, and 5 grandchildren and 2 ggc. The patient stopped using tobacco products in . The patient has never consumed significant amounts of alcohol. 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 PHYSICAL EXAMINATION:  Visit Vitals  /59 (BP 1 Location: Right arm, BP Patient Position: Sitting)   Pulse 70   Temp 98 °F (36.7 °C)   Ht 5' 10\" (1.778 m)   Wt 233 lb (105.7 kg)   SpO2 98%   BMI 33.43 kg/m²     General: No acute distress. Eyes: Sclera anicteric. ENT: No oral lesions. Thyroid normal.  Nodes: No adenopathy. Skin: No spider angiomata. No jaundice. No palmar erythema. Respiratory: Lungs clear to auscultation. Cardiovascular: Regular heart rate. No murmurs. No JVD. Abdomen: Soft non-tender. Liver size normal to percussion/palpation. Spleen not palpable. No obvious ascites. Extremities: No edema. No muscle wasting. No gross arthritic changes. Neurologic: Alert and oriented. Cranial nerves grossly intact. No asterixis. LABORATORY STUDIES:  Liver [place] of [address] [place] [address] & Units 12/10/2019 8/12/2019   WBC 4.6 - 13.2 K/uL 7.6 6.2   ANC 1.8 - 8.0 K/UL 5.0 3.7   HGB 13.0 - 16.0 g/dL 13.2 11.3 (L)    - 420 K/uL 137 137   INR 0.8 - 1.2    1.1   AST 10 - 38 U/L 31 49 (H)   ALT 16 - 61 U/L 37 40   Alk Phos 45 - 117 U/L 65 77   Bili, Total 0.2 - 1.0 MG/DL 0.8 0.7   Bili, Direct 0.0 - 0.2 MG/DL  0.3 (H)   Albumin 3.4 - 5.0 g/dL 4.1 3.8   BUN 7.0 - 18 MG/DL 19 (H) 13   Creat 0.6 - 1.3 MG/DL 1.11 1.11   Na 136 - 145 mmol/L 138 140   K 3.5 - 5.5 mmol/L 4.8 4.2   Cl 100 - 111 mmol/L 105 104   CO2 21 - 32 mmol/L 29 27   Glucose 74 - 99 mg/dL 87 163 (H)     SEROLOGIES:  Serologies Latest Ref Rng & Units 8/12/2019   Hep A Ab, Total NEGATIVE   NEGATIVE   Hep B Surface Ag <1.00 Index 0.46   Hep B Surface Ag Interp NEG   NEGATIVE   Hep B Core Ab, Total NEGATIVE   NEGATIVE   Hep B Surface Ab >10.0 mIU/mL <3.10 (L)   Hep B Surface Ab Interp POS   NEGATIVE (A)   Hep C Ab 0.0 - 0.9 s/co ratio 0.2   Ferritin 8 - 388 NG/ML 39   Iron % Saturation % 11   DALE, IFA  NEGATIVE   ASMCA 0 - 19 Units 19   Alpha-1 antitrypsin level 90 - 200 mg/dL 152     LIVER HISTOLOGY:  8/2019. Slides reviewed by MLS. ANAYA.  10-25% macrovesicualr and micovesicular steatosis, mild inflammation, severe ballooning, Stage 4 fibrosis. ENDOSCOPIC PROCEDURES:  Not available or performed    RADIOLOGY:  2/2019. Ultrasound of liver. Echogenic consistent with chronic liver disease. No liver mass lesions. No dilated bile ducts. No ascites. 7/2019. Dynamic MRI liver. Changes consistent with cirrhosis and fatty liver. No liver mass lesions. No dilated bile ducts. No bile duct strictures. No ascites. OTHER TESTING:  Not available or performed    FOLLOW-UP:  All of the issues listed above in the Assessment and Plan were discussed with the patient. All questions were answered. The patient expressed a clear understanding of the above.     Follow-up Jacinto [facility] [address] in 3 months for routine monitoring.       Keila Cerda, AGPCNP-BC  [address] [facility]  4 [facility], [address]  [facility], [address] - [address]  [phone]

## 2019-12-16 PROBLEM — K74.69 OTHER CIRRHOSIS OF LIVER (HCC): Status: ACTIVE | Noted: 2019-12-16

## 2019-12-16 PROBLEM — K75.81 NASH (NONALCOHOLIC STEATOHEPATITIS): Status: ACTIVE | Noted: 2019-12-16

## 2020-02-25 LAB
CREATININE, EXTERNAL: 0.9
HBA1C MFR BLD HPLC: 5.3 %
LDL-C, EXTERNAL: 49

## 2020-03-10 ENCOUNTER — OFFICE VISIT (OUTPATIENT)
Dept: HEMATOLOGY | Age: 75
End: 2020-03-10

## 2020-03-10 VITALS
HEART RATE: 78 BPM | BODY MASS INDEX: 33.55 KG/M2 | DIASTOLIC BLOOD PRESSURE: 75 MMHG | TEMPERATURE: 98 F | SYSTOLIC BLOOD PRESSURE: 135 MMHG | WEIGHT: 234.38 LBS | OXYGEN SATURATION: 98 % | HEIGHT: 70 IN

## 2020-03-10 DIAGNOSIS — K74.69 OTHER CIRRHOSIS OF LIVER (HCC): Primary | ICD-10-CM

## 2020-03-10 NOTE — PROGRESS NOTES
[address]40 \[facility] [place] [facility]\"", MD, MD Jens Guerrier PA-C [place] ACNP-BC     Nay Acuna, [facility]   Cristy Cooney FNP-ALEJANDRO Jones [facility]       Adrian Deputado Jose [address] [place] [address]    at [address], [address] [place] [address], [facility] 22.    [phone]    FAX: [address] [place] [address] Avenue    at [facility]    [address], [address]    [facility], [address] [place] [address] - [address]    [phone]    FAX: [phone]       Patient Care Team:  Aniya Stafford MD as PCP - General (Family Practice)      Problem List  Date Reviewed: 9/16/2019          Codes Class Noted    Other cirrhosis of liver [facility]) ICD-10-CM: K74.69  ICD-9-CM: 571.5  12/16/2019        ANAYA (nonalcoholic steatohepatitis) ICD-10-CM: K75.81  ICD-9-CM: 571.8  12/16/2019        Meniere disease (Chronic) ICD-10-CM: H81.09  ICD-9-CM: 386.00  5/26/2016        Heart murmur (Chronic) ICD-10-CM: R01.1  ICD-9-CM: 785.2  5/26/2016        Gout (Chronic) ICD-10-CM: M10.9  ICD-9-CM: 274.9  5/26/2016        History of colon cancer (Chronic) ICD-10-CM: Y22.415  ICD-9-CM: V10.05  5/26/2016        Kidney disease (Chronic) ICD-10-CM: N28.9  ICD-9-CM: 593.9  5/26/2016        Obesity (Chronic) ICD-10-CM: E66.9  ICD-9-CM: 278.00  5/26/2016        Osteoporosis (Chronic) ICD-10-CM: M81.0  ICD-9-CM: 733.00  5/26/2016        Complete rotator cuff tear or rupture of right shoulder, not specified as traumatic (Chronic) ICD-10-CM: M75.121  ICD-9-CM: 727.61  5/26/2016        Osteoarthritis, shoulder (Chronic) ICD-10-CM: M19.019  ICD-9-CM: 715.91  5/26/2016        Left knee DJD ICD-10-CM: M17.12  ICD-9-CM: 715.96  11/4/2013        Hypertension (Chronic) ICD-10-CM: I10  ICD-9-CM: 401.9  11/4/2013        Hyperlipidemia (Chronic) ICD-10-CM: O88.0  ICD-9-CM: 272.4  11/4/2013        Colon cancer ([facility] [address] (Chronic) ICD-10-CM: C18.9  ICD-9-CM: 153.9  11/4/2013              Ashley Hollingsworth returns to the [place] [address] for management of 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 The active problem list, all pertinent past medical history, medications,   liver histology, radiologic findings and laboratory findings related to the liver disorder were reviewed with the patient. The patient is a 69 y.o.  male who was found to have chronic liver disease and cirrhosis in 7/2019 when he had an MRI for evaluation of an abnormal liver CT scan. The patient underwent a liver biopsy in 8/2019. This demonstrates ANAYA with cirrhosis. The patient has not developed any of the major complications of cirrhosis to date. The patient has no symptoms which can be attributed to the liver disorder. The patient has not experienced the following symptoms:  fatigue, pain in the right side over the liver,     The patient completes all daily activities without any functional limitations. ASSESSMENT AND PLAN:  Cirrhosis  Cirrhosis is secondary to ANAYA. The diagnosis of cirrhosis is based upon liver biopsy, imaging, laboratory studies    The patient has normal liver function. The patient has never developed any complications of cirrhosis to date. The CTP is 5. Child class A. The MELD score is 8. ANAYA  The diagnosis is based upon liver biopsy, features of metabolic syndrome, serologic studies that are negative for other causes of chronic liver disease,   A liver biopsy performed in 8/2019 shows ANAYA with cirrhosis. Liver transaminases are normal.  ALP is normal.  Liver function is normal.  The platelet count is depressed.      Serologic testing for causes of chronic liver disease were negative     Counseling for diet and weight loss in patients with confirmed or suspected NAFLD  There is currently no FDA approved medical treatment for fatty liver, NALFD or ANAYA. The patient was counseled regarding diet and exercise to achieve weight loss. The best diet for patients with fatty liver is one very low in carbohydrates and enriched with protein such as an Alexis's program.      The patient was told not to consume any food products and drinks containing fructose as this enhances hepatic fat synthesis. There is no medication or vitamin supplements that we advocate for ANAYA. Using glitazones in patients without diabetes mellitus has been shown to reduce fat content in the liver but has no effect on fibrosis and is associated with weight gain. Vitamin E has also been used but the data is not very good and most experts no longer advocate this. The only medical treatments for ANAYA are through clinical trials. The patient was offered participation in a clinical trial for treatment of ANAYA. Screening for Esophageal varices   The patient has not had an EGD to screen for varices. Discussed risks associated with varices. The patient has chosen to defer EGD at this time. Hepatic encephalopathy   HE has not developed to date. There is no need for treatment with lactulose and/or Xifaxan at this time. There is no need to restrict dietary protein at this time. Thrombocytopenia   This is secondary to cirrhosis. There is no evidence of overt bleeding. No treatment is required. The platelet count is adequate for the patient to undergo procedures without the need for platelet transfusion or platelet growth factors. Anemia   This has resolved. Will continue oral iron supplements. Screening for Hepatocellular Carcinoma  HCC screening has recently been performed and does not suggest [facility] [address] 2 transient hypervascular foci present within the lateral segment left hepatic lobe and within segment VII. The next liver imaging study will be performed in 9/2020. AFP-L3% performed 8/2019 was within normal range. Treatment of other medical problems in patients with chronic liver disease  There are no contraindications for the patient to take most medications that are necessary for treatment of other medical issues. The patient has cirrhrosis and should avoid taking NSAIDs which are associated with a higher rate of developing STEFAN. The patient can take any medications utilized for treatment of DM, Statins to treat hypercholesterolemia    Counseling for alcohol in patients with chronic liver disease  The patient has cirrhosis and was advised to be abstinent from all alcohol including non-alcoholic beer which does contain some alcohol. The patient does not consume any significant amount of alcohol. Osteoporosis  The risk of osteoporosis is increased in patients with cirrhosis. DEXA bone density to assess for osteoporosis has not been performed. This should be ordered by the patients primary care physician. Vaccinations   Vaccination for viral hepatitis A and B is recommended since the patient has no serologic evidence of previous exposure or vaccination with immunity. Routine vaccinations against other bacterial and viral agents can be performed as indicated. Annual flu vaccination should be administered if indicated. ALLERGIES  No Known Allergies    MEDICATIONS  Current Outpatient Medications   Medication Sig    albuterol (PROVENTIL VENTOLIN) 2.5 mg /3 mL (0.083 %) nebu by Nebulization route.  traMADol (ULTRAM) 50 mg tablet Take 50 mg by mouth every six (6) hours as needed for Pain.  lisinopril (PRINIVIL, ZESTRIL) 5 mg tablet Take  by mouth daily.  cyclobenzaprine (FLEXERIL) 10 mg tablet Take  by mouth three (3) times daily as needed for Muscle Spasm(s).  cholecalciferol (VITAMIN D3) 1,000 unit cap Take 3,000 Units by mouth daily.  FOLIC ACID/MV,FE,MIN (CENTRUM PO) Take  by mouth daily.  NIFEdipine ER (PROCARDIA XL) 30 mg ER tablet Take  by mouth daily.     METOPROLOL TARTRATE PO Take 50 mg by mouth two (2) times a day.  simvastatin (ZOCOR) 20 mg tablet Take 20 mg by mouth nightly. No current facility-administered medications for this visit. SYSTEM REVIEW NOT RELATED TO LIVER DISEASE OR REVIEWED ABOVE:  Constitution systems: Negative for fever, chills, weight gain, weight loss. Eyes: Negative for visual changes. ENT: Negative for sore throat, painful swallowing. Respiratory: Negative for cough, hemoptysis, SOB. Cardiology: Negative for chest pain, palpitations. GI:  Negative for constipation or diarrhea. : Negative for urinary frequency, dysuria, hematuria, nocturia. Skin: Negative for rash. Hematology: Negative for easy bruising, blood clots. Musculo-skelatal: Negative for back pain, muscle pain, weakness. Neurologic: Negative for headaches, dizziness, vertigo, memory problems not related to HE. Psychology: Negative for anxiety, depression. FAMILY HISTORY:  The father  of heart disease. The mother  of heart disease. There is no family history of liver disease. SOCIAL HISTORY:  The patient is . The patient has 3 children, and 5 grandchildren and 2 ggc. The patient stopped using tobacco products in . The patient has never consumed significant amounts of alcohol. 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 PHYSICAL EXAMINATION:  Visit Vitals  /75   Pulse 78   Temp 98 °F (36.7 °C) (Tympanic)   Ht 5' 10\" (1.778 m)   Wt 234 lb 6 oz (106.3 kg)   SpO2 98%   BMI 33.63 kg/m²     General: No acute distress. Eyes: Sclera anicteric. ENT: No oral lesions. Thyroid normal.  Nodes: No adenopathy. Skin: No spider angiomata. No jaundice. No palmar erythema. Respiratory: Lungs clear to auscultation. Cardiovascular: Regular heart rate. No murmurs. No JVD. Abdomen: Soft non-tender. Liver size normal to percussion/palpation. Spleen not palpable.  No obvious ascites. Extremities: No edema. No muscle wasting. No gross arthritic changes. Neurologic: Alert and oriented. Cranial nerves grossly intact. No asterixis. LABORATORY STUDIES:  From 2/2020  AST/ALT/ALP/T Bili/ALB: 30/23/63/0.9/4.7  WBC/HB/PLT8.5/14.1/120:  BUN/CREAT: 18/0.9    Liver [place] [address] Ref Rng & Units 12/10/2019 8/12/2019   WBC 4.6 - 13.2 K/uL 7.6 6.2   ANC 1.8 - 8.0 K/UL 5.0 3.7   HGB 13.0 - 16.0 g/dL 13.2 11.3 (L)    - 420 K/uL 137 137   INR 0.8 - 1.2    1.1   AST 10 - 38 U/L 31 49 (H)   ALT 16 - 61 U/L 37 40   Alk Phos 45 - 117 U/L 65 77   Bili, Total 0.2 - 1.0 MG/DL 0.8 0.7   Bili, Direct 0.0 - 0.2 MG/DL  0.3 (H)   Albumin 3.4 - 5.0 g/dL 4.1 3.8   BUN 7.0 - 18 MG/DL 19 (H) 13   Creat 0.6 - 1.3 MG/DL 1.11 1.11   Na 136 - 145 mmol/L 138 140   K 3.5 - 5.5 mmol/L 4.8 4.2   Cl 100 - 111 mmol/L 105 104   CO2 21 - 32 mmol/L 29 27   Glucose 74 - 99 mg/dL 87 163 (H)     SEROLOGIES:  Serologies Latest Ref Rng & Units 8/12/2019   Hep A Ab, Total NEGATIVE   NEGATIVE   Hep B Surface Ag <1.00 Index 0.46   Hep B Surface Ag Interp NEG   NEGATIVE   Hep B Core Ab, Total NEGATIVE   NEGATIVE   Hep B Surface Ab >10.0 mIU/mL <3.10 (L)   Hep B Surface Ab Interp POS   NEGATIVE (A)   Hep C Ab 0.0 - 0.9 s/co ratio 0.2   Ferritin 8 - 388 NG/ML 39   Iron % Saturation % 11   DALE, IFA  NEGATIVE   ASMCA 0 - 19 Units 19   Alpha-1 antitrypsin level 90 - 200 mg/dL 152     LIVER HISTOLOGY:  8/2019. Slides reviewed by MLS. ANAYA.  10-25% macrovesicualr and micovesicular steatosis, mild inflammation, severe ballooning, Stage 4 fibrosis. ENDOSCOPIC PROCEDURES:  Not available or performed    RADIOLOGY:  2/2019. Ultrasound of liver. Echogenic consistent with chronic liver disease. No liver mass lesions. No dilated bile ducts. No ascites. 7/2019. Dynamic MRI liver. Changes consistent with cirrhosis and fatty liver. No liver mass lesions. No dilated bile ducts. No bile duct strictures. No ascites. 3/2020. Dynamic MRI liver. Changes consistent with cirrhosis. 2 small lesions involving segment VII and in the subcapsular portion of the lateral segment of the left hepatic lobe (confluence of segments II/III), with the largest one measuring 1.3 cm   No dilated bile ducts. No bile duct strictures. No ascites. OTHER TESTING:  Not available or performed    FOLLOW-UP:  All of the issues listed above in the Assessment and Plan were discussed with the patient. All questions were answered. The patient expressed a clear understanding of the above. [address] [place] [address] in 3 months for routine monitoring.       [address]urt Street, AGPCNP-BC  . Abebe Tolliver 144 [facility] [place] [facility]  4 [facility], [facility] [address]  98 [facility], [address] - [address]  [phone]

## 2020-10-02 PROBLEM — R97.20 ELEVATED PSA: Status: ACTIVE | Noted: 2020-10-02

## (undated) DEVICE — (D)SYR 10ML 1/5ML GRAD NSAF -- PKGING CHANGE USE ITEM 338027

## (undated) DEVICE — PAD NON-ADHERENT 3X4 STRL LF --

## (undated) DEVICE — MAYO STAND COVER: Brand: CONVERTORS

## (undated) DEVICE — INTENDED FOR TISSUE SEPARATION, AND OTHER PROCEDURES THAT REQUIRE A SHARP SURGICAL BLADE TO PUNCTURE OR CUT.: Brand: BARD-PARKER ®  SAFETY SCALPED

## (undated) DEVICE — MAX-CORE® DISPOSABLE CORE BIOPSY INSTRUMENT, 16G X 16CM: Brand: MAX-CORE

## (undated) DEVICE — KENDALL RADIOLUCENT FOAM MONITORING ELECTRODE RECTANGULAR SHAPE: Brand: KENDALL

## (undated) DEVICE — (D)BNDG ADHESIVE FABRIC 3/4X3 -- DISC BY MFR USE ITEM 357960

## (undated) DEVICE — TRAY PREP DRY W/ PREM GLV 2 APPL 6 SPNG 2 UNDPD 1 OVERWRAP

## (undated) DEVICE — SPONGE GZ W4XL4IN COT 12 PLY TYP VII WVN C FLD DSGN

## (undated) DEVICE — SOL IRRIGATION INJ NACL 0.9% 500ML BTL

## (undated) DEVICE — MAJ-1414 SINGLE USE ADPATER BIOPSY VALV: Brand: SINGLE USE ADAPTOR BIOPSY VALVE

## (undated) DEVICE — HYPODERMIC SAFETY NEEDLE: Brand: MAGELLAN

## (undated) DEVICE — DRAPE,APERTURE,MINOR PROCEDURE: Brand: MEDLINE

## (undated) DEVICE — SKIN MARKER,REGULAR TIP WITH RULER AND LABELS: Brand: DEVON